# Patient Record
Sex: MALE | Race: WHITE | Employment: FULL TIME | URBAN - METROPOLITAN AREA
[De-identification: names, ages, dates, MRNs, and addresses within clinical notes are randomized per-mention and may not be internally consistent; named-entity substitution may affect disease eponyms.]

---

## 2019-06-18 ENCOUNTER — OFFICE VISIT (OUTPATIENT)
Dept: URGENT CARE | Facility: CLINIC | Age: 39
End: 2019-06-18

## 2019-06-18 VITALS
BODY MASS INDEX: 21.7 KG/M2 | HEIGHT: 71 IN | TEMPERATURE: 98 F | RESPIRATION RATE: 18 BRPM | HEART RATE: 60 BPM | SYSTOLIC BLOOD PRESSURE: 112 MMHG | WEIGHT: 155 LBS | DIASTOLIC BLOOD PRESSURE: 84 MMHG

## 2019-06-18 DIAGNOSIS — B02.9 HERPES ZOSTER WITHOUT COMPLICATION: Primary | ICD-10-CM

## 2019-06-18 PROCEDURE — 99203 OFFICE O/P NEW LOW 30 MIN: CPT | Mod: S$GLB,,, | Performed by: PHYSICIAN ASSISTANT

## 2019-06-18 PROCEDURE — 99203 PR OFFICE/OUTPT VISIT, NEW, LEVL III, 30-44 MIN: ICD-10-PCS | Mod: S$GLB,,, | Performed by: PHYSICIAN ASSISTANT

## 2019-06-18 RX ORDER — ACYCLOVIR 800 MG/1
800 TABLET ORAL
Qty: 50 TABLET | Refills: 0 | Status: SHIPPED | OUTPATIENT
Start: 2019-06-18 | End: 2019-06-28

## 2019-06-18 RX ORDER — GABAPENTIN 300 MG/1
300 CAPSULE ORAL 3 TIMES DAILY
Qty: 30 CAPSULE | Refills: 0 | Status: SHIPPED | OUTPATIENT
Start: 2019-06-18 | End: 2019-06-28

## 2019-06-18 NOTE — PATIENT INSTRUCTIONS

## 2019-06-18 NOTE — PROGRESS NOTES
"Subjective:       Patient ID: Jose Elias Kulkarni is a 38 y.o. male.    Vitals:  height is 5' 11" (1.803 m) and weight is 70.3 kg (155 lb). His oral temperature is 98.1 °F (36.7 °C). His blood pressure is 112/84 and his pulse is 60. His respiration is 18.     Chief Complaint: Rash    BJ complains of a rash on his R arm and pain radiating up to his R shoulder. He states the rash first appeared Sunday.    Rash   This is a new problem. The current episode started in the past 7 days. The problem has been gradually worsening since onset. The affected locations include the right arm. The rash is characterized by dryness, pain and redness. He was exposed to nothing. Associated symptoms include joint pain. Pertinent negatives include no anorexia, congestion, cough, diarrhea, eye pain, facial edema, fatigue, fever, nail changes, rhinorrhea, shortness of breath, sore throat or vomiting. Past treatments include nothing. The treatment provided no relief.       Constitution: Negative for chills, fatigue and fever.   HENT: Negative for congestion and sore throat.    Neck: Negative for painful lymph nodes.   Cardiovascular: Negative for chest pain and leg swelling.   Eyes: Negative for eye pain, double vision and blurred vision.   Respiratory: Negative for cough and shortness of breath.    Gastrointestinal: Negative for nausea, vomiting and diarrhea.   Genitourinary: Negative for dysuria, frequency and urgency.   Musculoskeletal: Negative for joint pain, joint swelling, muscle cramps and muscle ache.   Skin: Positive for rash. Negative for color change, pale and erythema.   Allergic/Immunologic: Negative for seasonal allergies.   Neurological: Negative for dizziness, history of vertigo, light-headedness, passing out and headaches.   Hematologic/Lymphatic: Negative for swollen lymph nodes, easy bruising/bleeding and history of blood clots. Does not bruise/bleed easily.   Psychiatric/Behavioral: Negative for nervous/anxious, sleep " disturbance and depression. The patient is not nervous/anxious.        Objective:      Physical Exam   Constitutional: He is oriented to person, place, and time. He appears well-developed and well-nourished.   HENT:   Head: Normocephalic and atraumatic. Head is without abrasion, without contusion and without laceration.   Right Ear: External ear normal.   Left Ear: External ear normal.   Nose: Nose normal.   Mouth/Throat: Oropharynx is clear and moist.   Eyes: Pupils are equal, round, and reactive to light. Conjunctivae, EOM and lids are normal.   Neck: Trachea normal, full passive range of motion without pain and phonation normal. Neck supple.   Cardiovascular: Normal rate, regular rhythm and normal heart sounds.   Pulmonary/Chest: Effort normal and breath sounds normal. No stridor. No respiratory distress.   Musculoskeletal: Normal range of motion.   Neurological: He is alert and oriented to person, place, and time.   Skin: Skin is warm, dry and intact. Capillary refill takes less than 2 seconds. Rash noted. No abrasion, no bruising, no burn, no ecchymosis, no laceration and no lesion noted. Rash is vesicular. No erythema.        Psychiatric: He has a normal mood and affect. His speech is normal and behavior is normal. Judgment and thought content normal. Cognition and memory are normal.   Nursing note and vitals reviewed.              Assessment:       1. Herpes zoster without complication        Plan:         Herpes zoster without complication  -     acyclovir (ZOVIRAX) 800 MG Tab; Take 1 tablet (800 mg total) by mouth 5 (five) times daily. for 10 days  Dispense: 50 tablet; Refill: 0  -     gabapentin (NEURONTIN) 300 MG capsule; Take 1 capsule (300 mg total) by mouth 3 (three) times daily. Take 1 tablet on day one  Take 1 tablet BID day two  Then TID day 3-10 for 10 days  Dispense: 30 capsule; Refill: 0      Patient Instructions       Shingles (Herpes Zoster)     Talk to your healthcare provider about the  shingles vaccine.     Shingles is also called herpes zoster. It is a painful skin rash caused by the herpes zoster virus. This is the same virus that causes chickenpox. After a person has chickenpox, the virus remains inactive in the nerve cells. Years later, the virus can become active again and travel to the skin. Most people have shingles only once, but it is possible to have it more than once.  What are the risk factors for shingles?  Anyone who has ever had chickenpox can develop shingles. But your risk is greater if you:  · Are 50 years of age or older  · Have an illness that weakens your immune system, such as HIV/AIDS  · Have cancer, especially Hodgkin disease or lymphoma  · Take medicines that weaken your immune system  What are the symptoms of shingles?  · The first sign of shingles is usually pain, burning, tingling, or itching on one part of your face or body. You may also feel as if you have the flu, with fever and chills.  · A red rash with small blisters appears within a few days. The rash may appear as follows:   ¨ The blisters can occur anywhere, but theyre most common on the back, chest, or abdomen.  ¨ They usually appear on only one side of the body, spreading along the nerve pathway where the virus was inactive.   ¨ The rash can also form around an eye, along one side of the face or neck, or in the mouth.  ¨ In a few people, usually those with weakened immune systems, shingles appear on more than one part of the body at once.  · After a few days, the blisters become dry and form a crust. The crust falls off in days to weeks. The blisters generally do not leave scars.  How is shingles treated?  For most people, shingles heals on its own in a few weeks. But treatment is recommended to help relieve pain, speed healing, and reduce the risk of complications. Antiviral medicines are prescribed within the first 72 hours of the appearance of the rash. To lessen symptoms:  · Apply ice packs (wrapped in a  thin towel) or cool compresses, or soak in a cool bath.  · Use calamine lotion to calm itchy skin.  · Ask your healthcare provider about over-the-counter pain relievers. If your pain is severe, your healthcare provider may prescribe stronger pain medicines.  What are the complications of shingles?  Shingles often goes away with no lasting effects. But some people have serious problems long after the blisters have healed:  · Postherpetic neuralgia. This is the most common complication. It is severe nerve pain at the place where the rash used to be. It can last for months, or even years after you have had shingles. Medicines can be prescribed to help relieve the pain and improve quality of life.  · Bacterial infection. Shingles blisters may become infected with bacteria. Antibiotic medicine is used to treat the infection.  · Eye problems. A person with shingles on the face should see his or her healthcare provider right away. Shingles can cause serious problems with vision, and even blindness.  Very rarely shingles can also lead to pneumonia, hearing problems, brain inflammation, or even death.   When to seek medical care  Contact your healthcare provider if you experience any of the following:  · Symptoms that dont go away with treatment  · A rash or blisters near your eye  · Increased drainage, fever, or rash after treatment, or severe pain that doesnt go away   How can shingles be prevented?  You can only get shingles if you have had chicken pox in the past. Those who have never had chickenpox can get the virus from you. Although instead of developing shingles, the person may get chickenpox. Until your blisters form scabs, avoid contact with others, especially the following:  · Pregnant women who have never had chickenpox or the vaccine  · Infants who were born early (prematurely) or who had low weight at birth  · People with weak immune system (for example, people receiving chemotherapy for cancer, people who  have had organ transplants, or people with HIV infections)     The shingles vaccine  If youre 60 years of age or older, ask your healthcare provider if you should receive the shingles vaccine. The vaccine makes it less likely that you will develop shingles. If you do develop shingles, your symptoms will likely be milder than if you hadnt been vaccinated. Note: Although the vaccine is licensed for people 50 years of age or older, the CDC does not recommend the vaccine for those who are 50 to 59 years old.   Date Last Reviewed: 10/1/2016  © 1372-3846 [employer]. [address] [place] [address]. All rights reserved. This information is not intended as a substitute for professional medical care. Always follow your healthcare professional's instructions.

## 2021-05-24 ENCOUNTER — OFFICE VISIT (OUTPATIENT)
Dept: URGENT CARE | Facility: CLINIC | Age: 41
End: 2021-05-24

## 2021-05-24 VITALS
DIASTOLIC BLOOD PRESSURE: 75 MMHG | OXYGEN SATURATION: 96 % | SYSTOLIC BLOOD PRESSURE: 116 MMHG | HEIGHT: 71 IN | BODY MASS INDEX: 27.3 KG/M2 | TEMPERATURE: 98 F | HEART RATE: 100 BPM | WEIGHT: 195 LBS | RESPIRATION RATE: 16 BRPM

## 2021-05-24 DIAGNOSIS — R50.9 FEVER, UNSPECIFIED FEVER CAUSE: Primary | ICD-10-CM

## 2021-05-24 PROCEDURE — 99214 PR OFFICE/OUTPT VISIT, EST, LEVL IV, 30-39 MIN: ICD-10-PCS | Mod: TIER,S$GLB,, | Performed by: PHYSICIAN ASSISTANT

## 2021-05-24 PROCEDURE — 99214 OFFICE O/P EST MOD 30 MIN: CPT | Mod: TIER,S$GLB,, | Performed by: PHYSICIAN ASSISTANT
